# Patient Record
Sex: FEMALE | Race: WHITE | HISPANIC OR LATINO | Employment: UNEMPLOYED | URBAN - METROPOLITAN AREA
[De-identification: names, ages, dates, MRNs, and addresses within clinical notes are randomized per-mention and may not be internally consistent; named-entity substitution may affect disease eponyms.]

---

## 2024-02-28 ENCOUNTER — OFFICE VISIT (OUTPATIENT)
Dept: ORTHOPEDIC SURGERY | Facility: CLINIC | Age: 34
End: 2024-02-28
Payer: COMMERCIAL

## 2024-02-28 VITALS — HEIGHT: 65 IN | BODY MASS INDEX: 23.49 KG/M2 | WEIGHT: 141 LBS

## 2024-02-28 DIAGNOSIS — M25.531 RIGHT WRIST PAIN: Primary | ICD-10-CM

## 2024-02-28 DIAGNOSIS — M65.4 TENOSYNOVITIS, DE QUERVAIN: ICD-10-CM

## 2024-02-28 PROBLEM — M65.331 TRIGGER FINGER, RIGHT MIDDLE FINGER: Status: ACTIVE | Noted: 2024-02-28

## 2024-02-28 RX ADMIN — LIDOCAINE HYDROCHLORIDE 1 ML: 10 INJECTION, SOLUTION INFILTRATION; PERINEURAL at 15:36

## 2024-02-28 RX ADMIN — TRIAMCINOLONE ACETONIDE 40 MG: 40 INJECTION, SUSPENSION INTRA-ARTICULAR; INTRAMUSCULAR at 15:36

## 2024-02-28 NOTE — PROGRESS NOTES
"Chief Complaint  Initial Evaluation of the Right Wrist     Subjective      Loly Garcia presents to [facility] for initial evaluation of the right wrist. She is having pain in the wrist.  She has a baby and does a lot of lifting and carrying.  She has had some pain off and on occasionally in the past.  She used to get injections with DeQuervain in the past.  She wears a brace at times for support and stability.     No Known Allergies     Social History     Socioeconomic History    Marital status:    Tobacco Use    Smoking status: Never    Smokeless tobacco: Never   Vaping Use    Vaping Use: Never used        I reviewed the patient's chief complaint, history of present illness, review of systems, past medical history, surgical history, family history, social history, medications, and allergy list.     Review of Systems     Constitutional: Denies fevers, chills, weight loss  Cardiovascular: Denies chest pain, shortness of breath  Skin: Denies rashes, acute skin changes  Neurologic: Denies headache, loss of consciousness        Vital Signs:   Ht 165.1 cm (65\")   Wt 64 kg (141 lb)   BMI 23.46 kg/m²          Physical Exam  General: Alert. No acute distress    Ortho Exam        RIGHT WRIST  Full ROM of the hand, fingers, elbow and wrist.  Sensation grossly intact to light touch, median, radial and ulnar nerve. Positive AIN, PIN and ulnar nerve motor function intact. Axillary nerve intact. Positive pulses.        Right Carpal Tunnel  Date/Time: 2/28/2024 3:36 PM  Supporting Documentation  Indications: pain   Procedure Details  Location: -   Location: right carpal tunnel.Needle size: 23 G  Medications administered: 1 mL lidocaine 1 %; 40 mg triamcinolone acetonide 40 MG/ML  Patient tolerance: patient tolerated the procedure well with no immediate complications            Imaging Results (Most Recent)       None             Result Review :          Assessment and Plan     Diagnoses and all " orders for this visit:    1. Right wrist pain (Primary)  -     Right Carpal Tunnel    2. Tenosynovitis, de Quervain        Discussed the treatment plan with the patient.     Discussed injection She wants to hold off on that .      Discussed the risks and benefits of conservative measures.  The patient expressed understanding and wished to proceed with a right wrist steroid injection.  She tolerated the injection well.     Brace given.  HEP exercises.       Call or return if worsening symptoms.    Follow Up     PRN      Patient was given instructions and counseling regarding her condition or for health maintenance advice. Please see specific information pulled into the AVS if appropriate.     Scribed for Augie Worthy MD by Vashti Barr MA.  02/28/24   15:19 EST      I have personally performed the services described in this document as scribed by the above individual and it is both accurate and complete. Augie Worthy MD 02/29/24

## 2024-02-29 RX ORDER — TRIAMCINOLONE ACETONIDE 40 MG/ML
40 INJECTION, SUSPENSION INTRA-ARTICULAR; INTRAMUSCULAR
Status: COMPLETED | OUTPATIENT
Start: 2024-02-28 | End: 2024-02-28

## 2024-02-29 RX ORDER — LIDOCAINE HYDROCHLORIDE 10 MG/ML
1 INJECTION, SOLUTION INFILTRATION; PERINEURAL
Status: COMPLETED | OUTPATIENT
Start: 2024-02-28 | End: 2024-02-28

## 2024-04-02 ENCOUNTER — TELEPHONE (OUTPATIENT)
Dept: OBSTETRICS AND GYNECOLOGY | Facility: CLINIC | Age: 34
End: 2024-04-02
Payer: COMMERCIAL

## 2024-04-02 NOTE — TELEPHONE ENCOUNTER
Left a message for the patient no info in account that we tried to call her. She was advised it may of been a reminder call. Informed the patient to call back if further questions.

## 2024-04-02 NOTE — TELEPHONE ENCOUNTER
Caller: Loly Garcia    Relationship: Self    Best call back number: [phone]    What is the best time to reach you: ANYTIME    Who are you requesting to speak with (clinical staff, provider,  specific staff member): UNKNOWN    Do you know the name of the person who called: UNKNOWN    What was the call regarding: MAYBE ABOUT APPOINTMENT OR MEDICATION    Is it okay if the provider responds through MyChart: NO, CALL BACK PLEASE

## 2024-12-16 ENCOUNTER — OFFICE VISIT (OUTPATIENT)
Dept: ORTHOPEDIC SURGERY | Facility: CLINIC | Age: 34
End: 2024-12-16
Payer: COMMERCIAL

## 2024-12-16 VITALS — WEIGHT: 141 LBS | BODY MASS INDEX: 23.49 KG/M2 | HEIGHT: 65 IN

## 2024-12-16 DIAGNOSIS — M65.4 TENOSYNOVITIS, DE QUERVAIN: Primary | ICD-10-CM

## 2024-12-16 DIAGNOSIS — M25.531 RIGHT WRIST PAIN: ICD-10-CM

## 2024-12-16 RX ORDER — NORGESTIMATE AND ETHINYL ESTRADIOL 0.25-0.035
1 KIT ORAL DAILY
COMMUNITY

## 2024-12-16 RX ADMIN — TRIAMCINOLONE ACETONIDE 40 MG: 40 INJECTION, SUSPENSION INTRA-ARTICULAR; INTRAMUSCULAR at 11:20

## 2024-12-16 RX ADMIN — LIDOCAINE HYDROCHLORIDE 1 ML: 10 INJECTION, SOLUTION INFILTRATION; PERINEURAL at 11:20

## 2024-12-16 NOTE — PROGRESS NOTES
"Chief Complaint  Follow-up of the Right Wrist     Subjective      Loly Garcia presents to [facility] for follow up of the right wrist.  She is having pain in the wrist. She has a baby and does a lot of lifting and carrying. She has had some pain off and on occasionally in the past. She used to get injections with DeQuervain in the past. She wears a brace at times for support and stability. Her last injection was 2/28/24.      No Known Allergies     Social History     Socioeconomic History    Marital status:    Tobacco Use    Smoking status: Never    Smokeless tobacco: Never   Vaping Use    Vaping status: Never Used   Substance and Sexual Activity    Alcohol use: Yes     Alcohol/week: 2.0 standard drinks of alcohol     Types: 1 Glasses of wine, 1 Shots of liquor per week    Drug use: Never    Sexual activity: Yes     Partners: Male     Birth control/protection: Birth control pill        I reviewed the patient's chief complaint, history of present illness, review of systems, past medical history, surgical history, family history, social history, medications, and allergy list.     Review of Systems     Constitutional: Denies fevers, chills, weight loss  Cardiovascular: Denies chest pain, shortness of breath  Skin: Denies rashes, acute skin changes  Neurologic: Denies headache, loss of consciousness        Vital Signs:   Ht 165.1 cm (65\")   Wt 64 kg (141 lb)   BMI 23.46 kg/m²          Physical Exam  General: Alert. No acute distress    Ortho Exam        RIGHT WRIST Negative Compression testing/ Negative Tinels. PositiveFinkelsteins. Negative Marino's testing. Negative [facility]MC grind testing. Negative Phalens. Full ROM of the hand, fingers, elbow and wrist. Negative Triggering of the digit. Sensation grossly intact to light touch, median, radial and ulnar nerve. Positive AIN, PIN and ulnar nerve motor function intact. Axillary nerve intact. Positive pulses.  Mild swelling.       Right " Wrist Dequervains  Date/Time: 12/16/2024 11:20 AM  Consent given by: patient  Site marked: site marked  Timeout: Immediately prior to procedure a time out was called to verify the correct patient, procedure, equipment, support staff and site/side marked as required   Procedure Details  Location: wrist -   Preparation: Patient was prepped and draped in the usual sterile fashion  Needle size: 23 G  Medications administered: 1 mL lidocaine 1 %; 40 mg triamcinolone acetonide 40 MG/ML  Patient tolerance: patient tolerated the procedure well with no immediate complications    This injection documentation was Scribed for Augie Worthy MD by Edna Cherry.  12/16/24   11:20 EST        Imaging Results (Most Recent)       None             Result Review :             Assessment and Plan     Diagnoses and all orders for this visit:    1. Tenosynovitis, de Quervain (Primary)    2. Right wrist pain        Discussed the treatment plan with the patient.     Discussed the risks and benefits of conservative measures. The patient expressed understanding and wished to proceed with a right wrist steroid injection DeQuervain's.  She tolerated the injection well.     Discussed the treatment options with the patient, operative vs non-operative.       Call or return if worsening symptoms.    Follow Up     PRN      Patient was given instructions and counseling regarding her condition or for health maintenance advice. Please see specific information pulled into the AVS if appropriate.     Scribed for Augie Worthy MD by Vashti Barr MA.  12/16/24   10:12 EST    I have personally performed the services described in this document as scribed by the above individual and it is both accurate and complete. Augie Worthy MD 12/18/24

## 2024-12-18 RX ORDER — LIDOCAINE HYDROCHLORIDE 10 MG/ML
1 INJECTION, SOLUTION INFILTRATION; PERINEURAL
Status: COMPLETED | OUTPATIENT
Start: 2024-12-16 | End: 2024-12-16

## 2024-12-18 RX ORDER — TRIAMCINOLONE ACETONIDE 40 MG/ML
40 INJECTION, SUSPENSION INTRA-ARTICULAR; INTRAMUSCULAR
Status: COMPLETED | OUTPATIENT
Start: 2024-12-16 | End: 2024-12-16

## 2025-03-26 ENCOUNTER — OFFICE VISIT (OUTPATIENT)
Dept: ORTHOPEDIC SURGERY | Facility: CLINIC | Age: 35
End: 2025-03-26
Payer: COMMERCIAL

## 2025-03-26 VITALS
OXYGEN SATURATION: 98 % | SYSTOLIC BLOOD PRESSURE: 134 MMHG | DIASTOLIC BLOOD PRESSURE: 83 MMHG | HEART RATE: 54 BPM | WEIGHT: 148 LBS | BODY MASS INDEX: 24.66 KG/M2 | HEIGHT: 65 IN

## 2025-03-26 DIAGNOSIS — M25.531 RIGHT WRIST PAIN: Primary | ICD-10-CM

## 2025-03-26 DIAGNOSIS — M77.11 LATERAL EPICONDYLITIS OF RIGHT ELBOW: ICD-10-CM

## 2025-03-26 DIAGNOSIS — M65.931 SYNOVITIS OF RIGHT WRIST: ICD-10-CM

## 2025-03-26 RX ORDER — LIDOCAINE HYDROCHLORIDE 10 MG/ML
1 INJECTION, SOLUTION INFILTRATION; PERINEURAL
Status: COMPLETED | OUTPATIENT
Start: 2025-03-26 | End: 2025-03-26

## 2025-03-26 RX ORDER — TRIAMCINOLONE ACETONIDE 40 MG/ML
40 INJECTION, SUSPENSION INTRA-ARTICULAR; INTRAMUSCULAR
Status: COMPLETED | OUTPATIENT
Start: 2025-03-26 | End: 2025-03-26

## 2025-03-26 RX ADMIN — LIDOCAINE HYDROCHLORIDE 1 ML: 10 INJECTION, SOLUTION INFILTRATION; PERINEURAL at 11:59

## 2025-03-26 RX ADMIN — TRIAMCINOLONE ACETONIDE 40 MG: 40 INJECTION, SUSPENSION INTRA-ARTICULAR; INTRAMUSCULAR at 11:59

## 2025-03-26 NOTE — PROGRESS NOTES
"Chief Complaint  Follow-up of the Right Wrist     Subjective      Loly Garcia presents to [facility] for follow up of the right wrist.  She has pain on the top of the wrist.  She has been bracing at times.  She has a history of DeQuerveins.  She has tried anti inflammatories that is not giving much relief.  She is trying to modify activities of the right wrist.  She has a little one at home so does a lot of repetitive  activities     No Known Allergies     Social History     Socioeconomic History    Marital status:    Tobacco Use    Smoking status: Never    Smokeless tobacco: Never   Vaping Use    Vaping status: Never Used   Substance and Sexual Activity    Alcohol use: Yes     Alcohol/week: 2.0 standard drinks of alcohol     Types: 1 Glasses of wine, 1 Shots of liquor per week    Drug use: Never    Sexual activity: Yes     Partners: Male     Birth control/protection: Birth control pill        I reviewed the patient's chief complaint, history of present illness, review of systems, past medical history, surgical history, family history, social history, medications, and allergy list.     Review of Systems     Constitutional: Denies fevers, chills, weight loss  Cardiovascular: Denies chest pain, shortness of breath  Skin: Denies rashes, acute skin changes  Neurologic: Denies headache, loss of consciousness        Vital Signs:   /83   Pulse 54   Ht 165.1 cm (65\")   Wt 67.1 kg (148 lb)   SpO2 98%   BMI 24.63 kg/m²          Physical Exam  General: Alert. No acute distress    Ortho Exam        RIGHT WRIST Tender over the wrist.  Full ROM of the hand, fingers, elbow and wrist. . Sensation grossly intact to light touch, median, radial and ulnar nerve. Positive AIN, PIN and ulnar nerve motor function intact. Axillary nerve intact. Positive pulses.  Tender over the lateral epicondylitis.        Right Wrist  Date/Time: 3/26/2025 11:59 AM  Consent given by: patient  Site marked: " site marked  Timeout: Immediately prior to procedure a time out was called to verify the correct patient, procedure, equipment, support staff and site/side marked as required   Supporting Documentation  Indications: pain   Procedure Details  Location: wrist -   Preparation: Patient was prepped and draped in the usual sterile fashion  Needle size: 23 G  Medications administered: 1 mL lidocaine 1 %; 40 mg triamcinolone acetonide 40 MG/ML  Patient tolerance: patient tolerated the procedure well with no immediate complications    This injection documentation was Scribed for Augie Worthy MD by Edna Cherry.  03/26/25   12:00 EDT        Imaging Results (Most Recent)       None             Result Review :          Assessment and Plan     Diagnoses and all orders for this visit:    1. Right wrist pain (Primary)        Discussed the treatment plan with the patient.     Discussed physical therapy.  Discussed injection of the right wrist from overuse.      Modify activity. Brace as needed.  Prescribed physical therapy for the right wrist and elbow.     Discussed the risks and benefits of conservative measures. The patient expressed understanding and wished to proceed with a right wrist steroid injection.  She tolerated the injection well.      Discussed with the patient that due to the steroid injection given today in the office they may see an increase in blood sugar for a few days. Advised patient to monitor sugar after receiving the injection.     Discussed possibility of a reaction from the injection.  Discussed the possibility that the injection may not completely improve or remove the pain.  Discussed the risk of infection.      Call or return if worsening symptoms.    Follow Up     PRN Discussed MRI of the right wrist if the pain persists.        Patient was given instructions and counseling regarding her condition or for health maintenance advice. Please see specific information pulled into the AVS if  appropriate.     Scribed for Augie Worthy MD by Vashti Barr MA.  03/26/25   11:35 EDT      I have personally performed the services described in this document as scribed by the above individual and it is both accurate and complete. Augie Worthy MD 03/26/25

## 2025-03-27 DIAGNOSIS — M25.531 RIGHT WRIST PAIN: Primary | ICD-10-CM

## 2025-03-27 DIAGNOSIS — M65.931 SYNOVITIS OF RIGHT WRIST: ICD-10-CM

## 2025-04-09 ENCOUNTER — TREATMENT (OUTPATIENT)
Dept: PHYSICAL THERAPY | Facility: CLINIC | Age: 35
End: 2025-04-09
Payer: COMMERCIAL

## 2025-04-09 DIAGNOSIS — M25.531 RIGHT WRIST PAIN: Primary | ICD-10-CM

## 2025-04-09 DIAGNOSIS — M65.931 SYNOVITIS OF RIGHT WRIST: ICD-10-CM

## 2025-04-09 PROCEDURE — 97110 THERAPEUTIC EXERCISES: CPT | Performed by: PHYSICAL THERAPIST

## 2025-04-09 PROCEDURE — 97165 OT EVAL LOW COMPLEX 30 MIN: CPT | Performed by: PHYSICAL THERAPIST

## 2025-04-09 NOTE — PROGRESS NOTES
"Outpatient Occupational Therapy Ortho Initial Evaluation                                 [address] [place] [address]    Patient: Loly Garcia   : 1990  Diagnosis/ICD-10 Code:  Right wrist pain [M25.531]  Referring practitioner: Augie Worthy MD  Date of Initial Visit: 2025  Today's Date: 2025  Patient seen for 1 sessions               Subjective Questionnaire: QuickDASH:     Past Medical History: unremarkable    Subjective Evaluation    History of Present Illness  Mechanism of injury: Pt reports pain in the dorsal of her wrist over the past 3 months. She has history of R DeQuervains with 3-4 injections her last one in December. Pt received steroid injection on 3/26/25. Pt was given wrist support splint and referred for Occupational Therapy for evaluation and treatment. Pt reports the shot helped with continuous pain, but still not able to perform push up or weight bearing over last 4 years. Pt reports some discomfort in her R elbow also. She has good success with the brace as well.       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     Right Wrist/Hand   Tenderness in the first dorsal compartment and second dorsal compartment. No tenderness in the lateral epicondyle.     Additional Tenderness Details  Tenderness over R radial tunnel    Neurological Testing     Sensation     Wrist/Hand     Right   Intact: light touch    Active Range of Motion     Left Wrist   Wrist " flexion: 80 degrees   Wrist extension: 55 degrees   Radial deviation: 25 degrees   Ulnar deviation: 40 degrees     Right Wrist   Wrist flexion: 80 degrees   Wrist extension: 45 degrees   Radial deviation: 10 degrees   Ulnar deviation: 45 degrees     Additional Active Range of Motion Details  Full composite fist and thumb opposition with tightness in full thumb flexion    Strength/Myotome Testing     Left Wrist/Hand      (2nd hand position)     Trial 1: 73 lbs    Trial 2: 69 lbs    Trial 3: 74 lbs    Average: 72 lbs    Right Wrist/Hand   Wrist extension: 4+  Wrist flexion: 4+     (2nd hand position)     Trial 1: 67 lbs    Trial 2: 67 lbs    Trial 3: 69 lbs    Average: 67.67 lbs    Tests     Right Elbow   Negative Cozen's and Tinel's sign (cubital tunnel).     Right Wrist/Hand   Positive Tinel's sign (radial tunnel).   Negative Finkelstein's and Tinel's sign (medial nerve).           Assessment & Plan       Assessment  Impairments: abnormal coordination, abnormal or restricted ROM, activity intolerance, impaired physical strength and pain with function   Other impairment: pain when pinching and pull like when donning diapers on her son, lifting her son is painful, unable to weigth bear  Functional limitations: carrying objects, lifting, pulling and pushing   Prognosis: good    Goals  Plan Goals: 1. The patient complains of pain in the R wrist.                  LTG 1: 12 weeks:  The patient will report a pain rating of 1/10 or better in order to improve sleep quality and tolerance to performance of activities of daily living.                                  STATUS:  New                  STG 1a: 6 weeks:  The patient will report a pain rating of 4/10 or better at worst.                                   STATUS:  New  2. The patient has limited ROM of the R wrist                  LTG 2: 12 weeks:  The patient will demonstrate 55 degrees of wrist extension to allow the patient to weight bear on R wrist..                                   STATUS:  New                   STG 2a: 6 weeks:  The patient will demonstrate 50 degrees of wrist extension.                                  STATUS:  New                             3. The patient has limited strength of the R wrist..                  LTG 3: 12 weeks:  The patient will demonstrate MMT 5/5 in order to lift her child and valente his diapers.                                  STATUS:  New                  STG 3a: 6 weeks:  The patient will demonstrate independence with HEP for strengthening.                                  STATUS:  New  4. Carrying, Moving, and Handling Objects Functional Limitation                                    LTG 4: 12 weeks:  The patient will achieve a score of 11/55 on the Quick DASH.                                  STATUS:  New                  STG 4a: 6 weeks:  The patient will achieve a score of 16/55 on the Quick DASH.                                    STATUS:  New                        Plan  Planned modality interventions: TENS, thermotherapy (hydrocollator packs) and thermotherapy (paraffin bath)  Planned therapy interventions: manual therapy, functional ROM exercises, fine motor coordination training, flexibility, stretching, strengthening, home exercise program, neuromuscular re-education, soft tissue mobilization and therapeutic activities  Frequency: 2x week  Duration in weeks: 12  Treatment plan discussed with: patient          ICD-10-CM ICD-9-CM   1. Right wrist pain  M25.531 719.43   2. Synovitis of right wrist  M65.931 727.05       Patient is indicated for skilled occupational therapy services.    History # of Personal Factors and/or Comorbidities: LOW (0)  Examination of Body System(s): # of elements: MODERATE (3)  Clinical Presentation: STABLE   Clinical Decision Making: LOW      Evaluation:  Low Complexity:    30     mins  43801;  Mod Complexity:    0     mins  75908;  High Complexity:    0     mins  08553;    Timed:  Manual Therapy:     0     mins  17752;  Therapeutic Exercise:    15     mins  76772;   Therapeutic Activity:    0     mins  30570;  Neuromuscular Naresh:    0    mins  63541;    Ultrasound:     0     mins  09628;    Electrical Stimulation:    0     mins  14011 ( );      Timed Treatment:   15   mins   Total Treatment:     45   mins      OT SIGNATURE: Maribeth Tucker OTR/L    Electronically signed   License number [id number]   DATE TREATMENT INITIATED: 4/9/2025    Initial Certification  Certification Period: 4/9/2025 thru 7/7/2025  I certify that the therapy services are furnished while this patient is under my care.  The services outlined above are required by this patient, and will be reviewed every 90 days.     PHYSICIAN: Augie Worthy MD  NPI: [id number]                                          DATE:     Please sign and return via fax to  [phone]   Thank you, [facility].

## 2025-04-14 ENCOUNTER — TREATMENT (OUTPATIENT)
Dept: PHYSICAL THERAPY | Facility: CLINIC | Age: 35
End: 2025-04-14
Payer: COMMERCIAL

## 2025-04-14 DIAGNOSIS — M25.531 RIGHT WRIST PAIN: Primary | ICD-10-CM

## 2025-04-14 DIAGNOSIS — M65.931 SYNOVITIS OF RIGHT WRIST: ICD-10-CM

## 2025-04-14 PROCEDURE — 97110 THERAPEUTIC EXERCISES: CPT | Performed by: PHYSICAL THERAPIST

## 2025-04-14 PROCEDURE — 97530 THERAPEUTIC ACTIVITIES: CPT | Performed by: PHYSICAL THERAPIST

## 2025-04-14 PROCEDURE — 97112 NEUROMUSCULAR REEDUCATION: CPT | Performed by: PHYSICAL THERAPIST

## 2025-04-14 NOTE — PROGRESS NOTES
Occupational Therapy Daily Treatment Note  [address] [place] [id number]      Patient: Loly Garcia   : 1990  Referring practitioner: Augie Worthy MD  Date of Initial Visit: Type: THERAPY  Noted: 2025  Today's Date: 2025  Patient seen for 2 sessions         Subjective   Loly Garcia reports: her wrist is sore.    Objective   See Exercise, Manual, and Modality Logs for complete treatment.     Cupping performed along dorsal wrist and forearm with good response and minimal fibrotic tissue. Dynamic stabilization added today with good tolerance.  Assessment/Plan    Visit Diagnoses:    ICD-10-CM ICD-9-CM   1. Right wrist pain  M25.531 719.43   2. Synovitis of right wrist  M65.931 727.05       Continue per POC         Timed:  Manual Therapy:    8     mins  61273;  Therapeutic Exercise:    10     mins  65133;     Therapeutic Activity:    0    mins  15887;  Ultrasound:     0     mins  30944;    Electrical Stimulation:    0     mins 90384;  Neuromuscular Naresh:    10   mins  64582;      Timed Treatment:   28   mins   Total Treatment:     28   min    DEANNA Reyes/L  Occupational Therapist    Electronically signed   License number [id number]

## 2025-04-21 NOTE — PROGRESS NOTES
"Chief Complaint  Establish Care and Annual Exam    Subjective          Loly Garcia presents to [facility]  History of Present Illness    Previous PCP: Patient can not remember the providers name, but they was in [place]  Specialist(s): CHARITY Mahajan (GYN - [facility]), Maribeth Tucker (Occupational Therapy), Dr. Worthy (Orthopedics)  COVID vaccine: Last dose June 2021  Pap Smear: 04/12/2024    History of Present Illness      5-year-old female patient presents to the clinic today to establish care.  No significant medical history.  Takes multivitamin and oral birth control daily.  No breast cancer, colon cancer in her primary family members.  Pap smears up-to-date.  No concerns at this time.  Denies chest pain, shortness of breath, abdominal pain, nausea or vomiting diarrhea    Current Outpatient Medications   Medication Instructions    carbamide peroxide (Debrox) 6.5 % otic solution 5 drops, Both Ears, 2 Times Daily    multivitamin (MULTIVITAMIN PO) 1 tablet, Daily    norgestimate-ethinyl estradiol (ORTHO-CYCLEN) 0.25-35 MG-MCG per tablet 1 tablet, Daily       The following portions of the patient's history were reviewed and updated as appropriate: allergies, current medications, past family history, past medical history, past social history, past surgical history, and problem list.    Objective   Vital Signs:   /78 (BP Location: Right arm, Patient Position: Sitting, Cuff Size: Adult)   Pulse 67   Temp 97.3 °F (36.3 °C) (Temporal)   Ht 165.1 cm (65\")   Wt 67.1 kg (148 lb)   SpO2 98%   BMI 24.63 kg/m²     BP Readings from Last 3 Encounters:   04/25/25 113/78   03/26/25 134/83     Wt Readings from Last 3 Encounters:   04/25/25 67.1 kg (148 lb)   03/26/25 67.1 kg (148 lb)   12/16/24 64 kg (141 lb)     BMI is within normal parameters. No other follow-up for BMI required.     Physical Exam     Appearance: No acute distress, well-nourished  Head: " "normocephalic, atraumatic  Eyes: extraocular movements intact, no scleral icterus, no conjunctival injection  Ears, Nose, and Throat: external ears normal, nares patent, moist mucous membranes  Cardiovascular: regular rate and rhythm. no murmurs, rubs, or gallops. no edema  Respiratory: breathing comfortably, symmetric chest rise, clear to auscultation bilaterally. No wheezes, rales, or rhonchi.  Neuro: alert and oriented to time, place, and person. Normal gait  Psych: normal mood and affect     Physical Exam        Ear Cerumen Removal    Date/Time: 4/25/2025 11:25 AM    Performed by: Yari Lovell  Authorized by: Amber Ng APRN    Anesthesia:  Local Anesthetic: none  Location details: left ear and right ear  Patient tolerance: patient tolerated the procedure well with no immediate complications  Procedure type: instrumentation, irrigation   Sedation:  Patient sedated: no             Result Review :   The following data was reviewed by: CHARITY Young on 04/25/2025:      Results           No results found for: \"SARSANTIGEN\", \"COVID19\", \"RAPFLUA\", \"RAPFLUB\", \"FLUAAG\", \"FLUABDAG\", \"FLU\", \"FLUBAG\", \"RAPSCRN\", \"STREPAAG\", \"RSV\", \"POCPREGUR\", \"MONOSPOT\", \"INR\", \"LEADCAPBLD\", \"POCLEAD\", \"BILIRUBINUR\"         Assessment and Plan    Diagnoses and all orders for this visit:    1. Establishing care with new doctor, encounter for (Primary)    2. Annual physical exam  -     TSH Rfx On Abnormal To Free T4  -     CBC & Differential  -     Comprehensive Metabolic Panel  -     Lipid Panel    3. Screening for thyroid disorder  -     TSH Rfx On Abnormal To Free T4    4. Screening for lipid disorders  -     Lipid Panel    5. Bilateral impacted cerumen  -     Ear Cerumen Removal  -     carbamide peroxide (Debrox) 6.5 % otic solution; Administer 5 drops into both ears 2 (Two) Times a Day.  Dispense: 22 mL; Refill: 0      Advised on diet, physical activity, sunscreen, helmet, texting and driving, " etc    Assessment & Plan      There are no discontinued medications.       Follow Up   Return in about 1 year (around 4/25/2026) for Annual physical.  Patient was given instructions and counseling regarding her condition or for health maintenance advice. Please see specific information pulled into the AVS if appropriate.       CHARITY Young  04/25/25  16:01 EDT      Patient or patient representative verbalized consent for the use of Ambient Listening during the visit with  CHARITY Young for chart documentation. 4/25/2025  16:01 EDT

## 2025-04-23 ENCOUNTER — TREATMENT (OUTPATIENT)
Dept: PHYSICAL THERAPY | Facility: CLINIC | Age: 35
End: 2025-04-23
Payer: COMMERCIAL

## 2025-04-23 DIAGNOSIS — M65.931 SYNOVITIS OF RIGHT WRIST: ICD-10-CM

## 2025-04-23 DIAGNOSIS — M25.531 RIGHT WRIST PAIN: Primary | ICD-10-CM

## 2025-04-23 PROCEDURE — 97140 MANUAL THERAPY 1/> REGIONS: CPT | Performed by: PHYSICAL THERAPIST

## 2025-04-23 PROCEDURE — 97112 NEUROMUSCULAR REEDUCATION: CPT | Performed by: PHYSICAL THERAPIST

## 2025-04-23 PROCEDURE — 97110 THERAPEUTIC EXERCISES: CPT | Performed by: PHYSICAL THERAPIST

## 2025-04-23 PROCEDURE — 97530 THERAPEUTIC ACTIVITIES: CPT | Performed by: PHYSICAL THERAPIST

## 2025-04-23 NOTE — PROGRESS NOTES
Occupational Therapy Daily Treatment Note  [address] [place] [address]      Patient: Loly Garcia   : 1990  Referring practitioner: Augie Worthy MD  Date of Initial Visit: Type: THERAPY  Noted: 2025  Today's Date: 2025  Patient seen for 3 sessions         Subjective   Loly Garcia reports: in general I have not really had any pain, but I still can't do a push up.     Objective          Observations     Additional Wrist/Hand Observation Details  Increased visible snuff box on R wrist     Tenderness     Right Elbow   No tenderness in the lateral epicondyle.     Right Wrist/Hand   Tenderness in the first dorsal compartment and second dorsal compartment. No tenderness in the lateral epicondyle.     Additional Tenderness Details  Tenderness over R radial tunnel    Neurological Testing     Sensation     Wrist/Hand     Right   Intact: light touch    Active Range of Motion     Left Wrist   Wrist flexion: 80 degrees   Wrist extension: 55 degrees   Radial deviation: 25 degrees   Ulnar deviation: 40 degrees     Right Wrist   Wrist flexion: 80 degrees   Wrist extension: 50 degrees   Radial deviation: 10 degrees   Ulnar deviation: 45 degrees     Additional Active Range of Motion Details  Full composite fist and thumb opposition with tightness in full thumb flexion    Strength/Myotome Testing     Left Wrist/Hand      (2nd hand position)     Trial 1: 73 lbs    Trial 2: 69 lbs    Trial 3: 74 lbs    Average: 72 lbs    Right Wrist/Hand   Wrist extension: 4+  Wrist flexion: 4+     (2nd hand position)     Trial 1: 62 lbs    Trial 2: 64 lbs    Trial 3: 61 lbs    Average: 62.33 lbs    Tests     Right Elbow   Negative Cozen's and Tinel's sign (cubital tunnel).     Right Wrist/Hand   Positive Tinel's sign (radial tunnel).   Negative Finkelstein's and Tinel's sign (medial nerve).       See Exercise, Manual, and Modality Logs for complete treatment.     Moderate fibrotic tissue noted along dorsal radial  forearm with cupping and patient tolerated well.   Assessment/Plan    Visit Diagnoses:    ICD-10-CM ICD-9-CM   1. Right wrist pain  M25.531 719.43   2. Synovitis of right wrist  M65.931 727.05       Continue per POC         Timed:  Manual Therapy:    8     mins  97608;  Therapeutic Exercise:    8     mins  39647;     Therapeutic Activity:    8     mins  99068;  Ultrasound:     0     mins  32311;    Electrical Stimulation:    0     mins 91460;  Neuromuscular Naresh:    8    mins  88880;      Timed Treatment:   32   mins   Total Treatment:     32   min    DEANNA Reyes/L  Occupational Therapist    Electronically signed   License number [id number]

## 2025-04-25 ENCOUNTER — OFFICE VISIT (OUTPATIENT)
Dept: INTERNAL MEDICINE | Facility: CLINIC | Age: 35
End: 2025-04-25
Payer: COMMERCIAL

## 2025-04-25 ENCOUNTER — TREATMENT (OUTPATIENT)
Dept: PHYSICAL THERAPY | Facility: CLINIC | Age: 35
End: 2025-04-25
Payer: COMMERCIAL

## 2025-04-25 VITALS
BODY MASS INDEX: 24.66 KG/M2 | HEIGHT: 65 IN | SYSTOLIC BLOOD PRESSURE: 113 MMHG | WEIGHT: 148 LBS | HEART RATE: 67 BPM | DIASTOLIC BLOOD PRESSURE: 78 MMHG | TEMPERATURE: 97.3 F | OXYGEN SATURATION: 98 %

## 2025-04-25 DIAGNOSIS — Z13.29 SCREENING FOR THYROID DISORDER: ICD-10-CM

## 2025-04-25 DIAGNOSIS — Z76.89 ESTABLISHING CARE WITH NEW DOCTOR, ENCOUNTER FOR: Primary | ICD-10-CM

## 2025-04-25 DIAGNOSIS — M65.931 SYNOVITIS OF RIGHT WRIST: ICD-10-CM

## 2025-04-25 DIAGNOSIS — Z00.00 ANNUAL PHYSICAL EXAM: ICD-10-CM

## 2025-04-25 DIAGNOSIS — M25.531 RIGHT WRIST PAIN: Primary | ICD-10-CM

## 2025-04-25 DIAGNOSIS — Z13.220 SCREENING FOR LIPID DISORDERS: ICD-10-CM

## 2025-04-25 DIAGNOSIS — H61.23 BILATERAL IMPACTED CERUMEN: ICD-10-CM

## 2025-04-25 LAB
ALBUMIN SERPL-MCNC: 4.2 G/DL (ref 3.5–5.2)
ALBUMIN/GLOB SERPL: 1.3 G/DL
ALP SERPL-CCNC: 75 U/L (ref 39–117)
ALT SERPL W P-5'-P-CCNC: 16 U/L (ref 1–33)
ANION GAP SERPL CALCULATED.3IONS-SCNC: 7.7 MMOL/L (ref 5–15)
AST SERPL-CCNC: 19 U/L (ref 1–32)
BASOPHILS # BLD AUTO: 0.04 10*3/MM3 (ref 0–0.2)
BASOPHILS NFR BLD AUTO: 0.8 % (ref 0–1.5)
BILIRUB SERPL-MCNC: 0.4 MG/DL (ref 0–1.2)
BUN SERPL-MCNC: 16 MG/DL (ref 6–20)
BUN/CREAT SERPL: 20.8 (ref 7–25)
CALCIUM SPEC-SCNC: 9.3 MG/DL (ref 8.6–10.5)
CHLORIDE SERPL-SCNC: 103 MMOL/L (ref 98–107)
CHOLEST SERPL-MCNC: 203 MG/DL (ref 0–200)
CO2 SERPL-SCNC: 26.3 MMOL/L (ref 22–29)
CREAT SERPL-MCNC: 0.77 MG/DL (ref 0.57–1)
DEPRECATED RDW RBC AUTO: 40.4 FL (ref 37–54)
EGFRCR SERPLBLD CKD-EPI 2021: 102.7 ML/MIN/1.73
EOSINOPHIL # BLD AUTO: 0.06 10*3/MM3 (ref 0–0.4)
EOSINOPHIL NFR BLD AUTO: 1.2 % (ref 0.3–6.2)
ERYTHROCYTE [DISTWIDTH] IN BLOOD BY AUTOMATED COUNT: 12.4 % (ref 12.3–15.4)
GLOBULIN UR ELPH-MCNC: 3.3 GM/DL
GLUCOSE SERPL-MCNC: 64 MG/DL (ref 65–99)
HCT VFR BLD AUTO: 41.8 % (ref 34–46.6)
HDLC SERPL-MCNC: 71 MG/DL (ref 40–60)
HGB BLD-MCNC: 13.9 G/DL (ref 12–15.9)
IMM GRANULOCYTES # BLD AUTO: 0.01 10*3/MM3 (ref 0–0.05)
IMM GRANULOCYTES NFR BLD AUTO: 0.2 % (ref 0–0.5)
LDLC SERPL CALC-MCNC: 114 MG/DL (ref 0–100)
LDLC/HDLC SERPL: 1.56 {RATIO}
LYMPHOCYTES # BLD AUTO: 1.27 10*3/MM3 (ref 0.7–3.1)
LYMPHOCYTES NFR BLD AUTO: 25.2 % (ref 19.6–45.3)
MCH RBC QN AUTO: 29.2 PG (ref 26.6–33)
MCHC RBC AUTO-ENTMCNC: 33.3 G/DL (ref 31.5–35.7)
MCV RBC AUTO: 87.8 FL (ref 79–97)
MONOCYTES # BLD AUTO: 0.26 10*3/MM3 (ref 0.1–0.9)
MONOCYTES NFR BLD AUTO: 5.2 % (ref 5–12)
NEUTROPHILS NFR BLD AUTO: 3.39 10*3/MM3 (ref 1.7–7)
NEUTROPHILS NFR BLD AUTO: 67.4 % (ref 42.7–76)
NRBC BLD AUTO-RTO: 0 /100 WBC (ref 0–0.2)
PLATELET # BLD AUTO: 240 10*3/MM3 (ref 140–450)
PMV BLD AUTO: 10.5 FL (ref 6–12)
POTASSIUM SERPL-SCNC: 4.6 MMOL/L (ref 3.5–5.2)
PROT SERPL-MCNC: 7.5 G/DL (ref 6–8.5)
RBC # BLD AUTO: 4.76 10*6/MM3 (ref 3.77–5.28)
SODIUM SERPL-SCNC: 137 MMOL/L (ref 136–145)
TRIGL SERPL-MCNC: 105 MG/DL (ref 0–150)
TSH SERPL DL<=0.05 MIU/L-ACNC: 1.28 UIU/ML (ref 0.27–4.2)
VLDLC SERPL-MCNC: 18 MG/DL (ref 5–40)
WBC NRBC COR # BLD AUTO: 5.03 10*3/MM3 (ref 3.4–10.8)

## 2025-04-25 PROCEDURE — 97112 NEUROMUSCULAR REEDUCATION: CPT | Performed by: PHYSICAL THERAPIST

## 2025-04-25 PROCEDURE — 97530 THERAPEUTIC ACTIVITIES: CPT | Performed by: PHYSICAL THERAPIST

## 2025-04-25 PROCEDURE — 80061 LIPID PANEL: CPT | Performed by: NURSE PRACTITIONER

## 2025-04-25 PROCEDURE — 80050 GENERAL HEALTH PANEL: CPT | Performed by: NURSE PRACTITIONER

## 2025-04-25 PROCEDURE — 97110 THERAPEUTIC EXERCISES: CPT | Performed by: PHYSICAL THERAPIST

## 2025-04-25 RX ORDER — DIPHENOXYLATE HYDROCHLORIDE AND ATROPINE SULFATE 2.5; .025 MG/1; MG/1
1 TABLET ORAL DAILY
COMMUNITY

## 2025-04-25 NOTE — PROGRESS NOTES
Occupational Therapy Daily Treatment Note  [address] [place] [address]      Patient: Loly Garcia   : 1990  Referring practitioner: Augie Worthy MD  Date of Initial Visit: Type: THERAPY  Noted: 2025  Today's Date: 2025  Patient seen for 4 sessions         Subjective   Loly Garcia reports: yesterday I was vacuuming and moved my arm sideways and I could feel it. It feels ok today.     Objective   See Exercise, Manual, and Modality Logs for complete treatment.   Pt did have sharp pain with wrist extension with flexbar and also felt some discomfort with gripping in pronation. Pt has more discomfort in her thumb today than the wrist.     Assessment/Plan    Visit Diagnoses:    ICD-10-CM ICD-9-CM   1. Right wrist pain  M25.531 719.43   2. Synovitis of right wrist  M65.931 727.05       Continue per POC          Timed:  Manual Therapy:    0     mins  92345;  Therapeutic Exercise:    10     mins  46123;     Therapeutic Activity:    10     mins  93289;  Ultrasound:     0     mins  96400;    Electrical Stimulation:    0     mins 85945;  Neuromuscular Naresh:    10    mins  05477;      Timed Treatment:   30   mins   Total Treatment:     30   min    DEANNA Reyes/L  Occupational Therapist    Electronically signed   License number [id number]   Please monitor blood pressure and pulse twice a day and write down readings. Bring numbers and machine in when you come in for blood pressure check with the nurse.     Recommend RSV vaccine.     Call to schedule eye doctor appt with Dr Brennan

## 2025-04-29 ENCOUNTER — TREATMENT (OUTPATIENT)
Dept: PHYSICAL THERAPY | Facility: CLINIC | Age: 35
End: 2025-04-29
Payer: COMMERCIAL

## 2025-04-29 DIAGNOSIS — M65.931 SYNOVITIS OF RIGHT WRIST: ICD-10-CM

## 2025-04-29 DIAGNOSIS — M25.531 RIGHT WRIST PAIN: Primary | ICD-10-CM

## 2025-04-29 PROCEDURE — 97110 THERAPEUTIC EXERCISES: CPT | Performed by: PHYSICAL THERAPIST

## 2025-04-29 PROCEDURE — 97112 NEUROMUSCULAR REEDUCATION: CPT | Performed by: PHYSICAL THERAPIST

## 2025-04-29 PROCEDURE — 97140 MANUAL THERAPY 1/> REGIONS: CPT | Performed by: PHYSICAL THERAPIST

## 2025-04-29 PROCEDURE — 97530 THERAPEUTIC ACTIVITIES: CPT | Performed by: PHYSICAL THERAPIST

## 2025-04-29 NOTE — PROGRESS NOTES
Occupational Therapy Daily Treatment Note  [address] [place] [address]      Patient: Loly Garcia   : 1990  Referring practitioner: Augie Worthy MD  Date of Initial Visit: Type: THERAPY  Noted: 2025  Today's Date: 2025  Patient seen for 5 sessions         Subjective Evaluation    Pain  Current pain ratin  Location: R wrist       Loly Garcia reports: the last couple days it has been hurting. I do get a little shock that goes into the hand with certain movements especially wrist extension.    Objective          Observations     Additional Wrist/Hand Observation Details  Increased visible snuff box on R wrist     Tenderness     Right Elbow   No tenderness in the lateral epicondyle.     Right Wrist/Hand   Tenderness in the second dorsal compartment. No tenderness in the first dorsal compartment and lateral epicondyle.     Additional Tenderness Details  Tenderness over R radial tunnel    Neurological Testing     Sensation     Wrist/Hand     Right   Intact: light touch    Active Range of Motion     Left Wrist   Wrist flexion: 80 degrees   Wrist extension: 55 degrees   Radial deviation: 25 degrees   Ulnar deviation: 40 degrees     Right Wrist   Wrist flexion: 80 degrees   Wrist extension: 50 degrees   Radial deviation: 10 degrees   Ulnar deviation: 45 degrees     Additional Active Range of Motion Details  Full composite fist and thumb opposition with tightness in full thumb flexion    Strength/Myotome Testing     Left Wrist/Hand      (2nd hand position)     Trial 1: 73 lbs    Trial 2: 69 lbs    Trial 3: 74 lbs    Average: 72 lbs    Right Wrist/Hand   Wrist extension: 4+  Wrist flexion: 4+     (2nd hand position)     Trial 1: 62 lbs    Trial 2: 64 lbs    Trial 3: 65 lbs    Average: 63.67 lbs    Tests     Right Elbow   Negative Cozen's and Tinel's sign (cubital tunnel).     Right Wrist/Hand   Positive Tinel's sign (radial tunnel).   Negative Finkelstein's and Tinel's sign (medial  nerve).       See Exercise, Manual, and Modality Logs for complete treatment.     Pt had discomfort with dynamic stabilization with Frisbee and with wrist maze. Kinesio tape applied over dorsal wrist with 50% pull splint in middle to provide stability and for pain relief.  Assessment/Plan    Visit Diagnoses:    ICD-10-CM ICD-9-CM   1. Right wrist pain  M25.531 719.43   2. Synovitis of right wrist  M65.931 727.05       Pt instructed to return to isometrics instead of resistive wrist 3 planes. Continue per POC         Timed:  Manual Therapy:    8     mins  88676;  Therapeutic Exercise:    8     mins  22929;     Therapeutic Activity:   8    mins  28979;  Ultrasound:     0     mins  05119;    Electrical Stimulation:    0     mins 12608;  Neuromuscular Naresh:    8    mins  74249;      Timed Treatment:   32   mins   Total Treatment:     32   min    Maribeth Tucker OTR/L  Occupational Therapist    Electronically signed   License number [id number]

## 2025-04-30 ENCOUNTER — TREATMENT (OUTPATIENT)
Dept: PHYSICAL THERAPY | Facility: CLINIC | Age: 35
End: 2025-04-30
Payer: COMMERCIAL

## 2025-04-30 DIAGNOSIS — M65.931 SYNOVITIS OF RIGHT WRIST: ICD-10-CM

## 2025-04-30 DIAGNOSIS — M25.531 RIGHT WRIST PAIN: Primary | ICD-10-CM

## 2025-04-30 PROCEDURE — 97530 THERAPEUTIC ACTIVITIES: CPT | Performed by: PHYSICAL THERAPIST

## 2025-04-30 PROCEDURE — 97110 THERAPEUTIC EXERCISES: CPT | Performed by: PHYSICAL THERAPIST

## 2025-04-30 PROCEDURE — 97112 NEUROMUSCULAR REEDUCATION: CPT | Performed by: PHYSICAL THERAPIST

## 2025-04-30 PROCEDURE — 97140 MANUAL THERAPY 1/> REGIONS: CPT | Performed by: PHYSICAL THERAPIST

## 2025-04-30 NOTE — PROGRESS NOTES
Occupational Therapy Daily Treatment Note  [address] [place] [address]      Patient: Loly Garcia   : 1990  Referring practitioner: Augie Worthy MD  Date of Initial Visit: Type: THERAPY  Noted: 2025  Today's Date: 2025  Patient seen for 6 sessions         Subjective Evaluation    Pain  Current pain ratin  Location: R wrist       Loly Garcia reports: I noticed that when I put my hands on the treadmill to push it did not hurt like it used to. If I move it back too fast I can feel it more. The pain is always on the top.    Objective   See Exercise, Manual, and Modality Logs for complete treatment.   Kinesio tape still in place over dorsal wrist.   Pt tolerated treatment well. She still had discomfort with dynamic stabilization with frisbe and marble. Soft tissue mobilization performed with cupping and moderate fibrotic tissue noted along dorsal forearm  Assessment/Plan    Visit Diagnoses:    ICD-10-CM ICD-9-CM   1. Right wrist pain  M25.531 719.43   2. Synovitis of right wrist  M65.931 727.05       Continue per POC         Timed:  Manual Therapy:    8     mins  77155;  Therapeutic Exercise:    8     mins  06582;     Therapeutic Activity:    8     mins  69308;  Ultrasound:     0     mins  94716;    Electrical Stimulation:    0     mins 83557;  Neuromuscular Naresh:    8    mins  26637;      Timed Treatment:   32   mins   Total Treatment:     32   min    DEANNA Reyes/L  Occupational Therapist    Electronically signed   License number [id number]

## 2025-05-02 ENCOUNTER — PATIENT ROUNDING (BHMG ONLY) (OUTPATIENT)
Dept: INTERNAL MEDICINE | Facility: CLINIC | Age: 35
End: 2025-05-02
Payer: COMMERCIAL

## 2025-05-02 NOTE — PROGRESS NOTES
May 2, 2025    Hello, may I speak with Loly Garcia?    My name is Chhaya      I am  with [facility]  [address]  [place] [address] [phone].    Before we get started may I verify your date of birth? 1990    I am calling to officially welcome you to our practice and ask about your recent visit. Is this a good time to talk? My chart message sent for patient rounding.

## 2025-05-05 ENCOUNTER — TREATMENT (OUTPATIENT)
Dept: PHYSICAL THERAPY | Facility: CLINIC | Age: 35
End: 2025-05-05
Payer: COMMERCIAL

## 2025-05-05 DIAGNOSIS — M65.931 SYNOVITIS OF RIGHT WRIST: ICD-10-CM

## 2025-05-05 DIAGNOSIS — M25.531 RIGHT WRIST PAIN: Primary | ICD-10-CM

## 2025-05-05 PROCEDURE — 97530 THERAPEUTIC ACTIVITIES: CPT | Performed by: PHYSICAL THERAPIST

## 2025-05-05 PROCEDURE — 97140 MANUAL THERAPY 1/> REGIONS: CPT | Performed by: PHYSICAL THERAPIST

## 2025-05-05 PROCEDURE — 97110 THERAPEUTIC EXERCISES: CPT | Performed by: PHYSICAL THERAPIST

## 2025-05-05 PROCEDURE — 97112 NEUROMUSCULAR REEDUCATION: CPT | Performed by: PHYSICAL THERAPIST

## 2025-05-05 NOTE — PROGRESS NOTES
Occupational Therapy Daily Treatment Note  [address] [place] [address]      Patient: Loly Garcia   : 1990  Referring practitioner: Augie Worthy MD  Date of Initial Visit: Type: THERAPY  Noted: 2025  Today's Date: 2025  Patient seen for 7 sessions         Subjective   Loly Garcia reports: I went over to the gym and did some planks and had no pain. It feels really good today. Still no push ups. Over the weekend I could still feel some with a circular motion.     Objective   See Exercise, Manual, and Modality Logs for complete treatment.   Pt tolerated treatment well with no pain. She did have pain with cupping along first dorsal compartment.     Assessment/Plan    Visit Diagnoses:    ICD-10-CM ICD-9-CM   1. Right wrist pain  M25.531 719.43   2. Synovitis of right wrist  M65.931 727.05       Continue per POC for one more visit then prepare for discharge to [facility].         Timed:  Manual Therapy:    8     mins  08567;  Therapeutic Exercise:    8     mins  83654;     Therapeutic Activity:    8     mins  67773;  Ultrasound:     0     mins  88477;    Electrical Stimulation:    0     mins 09948;  Neuromuscular Naresh:    8    mins  86610;      Timed Treatment:   32   mins   Total Treatment:     32   min    DEANNA Reyes/L  Occupational Therapist    Electronically signed   License number [id number]

## 2025-05-07 ENCOUNTER — TREATMENT (OUTPATIENT)
Dept: PHYSICAL THERAPY | Facility: CLINIC | Age: 35
End: 2025-05-07
Payer: COMMERCIAL

## 2025-05-07 DIAGNOSIS — M25.531 RIGHT WRIST PAIN: Primary | ICD-10-CM

## 2025-05-07 DIAGNOSIS — M65.931 SYNOVITIS OF RIGHT WRIST: ICD-10-CM

## 2025-05-07 PROCEDURE — 97140 MANUAL THERAPY 1/> REGIONS: CPT | Performed by: PHYSICAL THERAPIST

## 2025-05-07 PROCEDURE — 97112 NEUROMUSCULAR REEDUCATION: CPT | Performed by: PHYSICAL THERAPIST

## 2025-05-07 PROCEDURE — 97530 THERAPEUTIC ACTIVITIES: CPT | Performed by: PHYSICAL THERAPIST

## 2025-05-07 PROCEDURE — 97110 THERAPEUTIC EXERCISES: CPT | Performed by: PHYSICAL THERAPIST

## 2025-05-07 NOTE — PROGRESS NOTES
Occupational Therapy Daily Treatment Note  [address] [place] [address]      Patient: Loly Garcia   : 1990  Referring practitioner: Augie Worthy MD  Date of Initial Visit: Type: THERAPY  Noted: 2025  Today's Date: 2025  Patient seen for 8 sessions         Subjective   Loly Garcia reports: I did go to the gym and used a 3 lb weight in mid range with no issues with wrist flexion and RD, but some pain with extension and if I went all the way. I did some dips but only got to 4 before it started getting sore.    Objective          Observations     Additional Wrist/Hand Observation Details  Increased visible snuff box on R wrist     Tenderness     Right Elbow   No tenderness in the lateral epicondyle.     Right Wrist/Hand   Tenderness in the first dorsal compartment and second dorsal compartment. No tenderness in the lateral epicondyle.     Additional Tenderness Details  Tenderness over R radial tunnel    Neurological Testing     Sensation     Wrist/Hand     Right   Intact: light touch    Active Range of Motion     Left Wrist   Wrist flexion: 80 degrees   Wrist extension: 55 degrees   Radial deviation: 25 degrees   Ulnar deviation: 40 degrees     Right Wrist   Wrist flexion: 80 degrees   Wrist extension: 50 degrees   Radial deviation: 10 degrees   Ulnar deviation: 45 degrees     Additional Active Range of Motion Details  Full composite fist and thumb opposition with tightness in full thumb flexion    Strength/Myotome Testing     Left Wrist/Hand      (2nd hand position)     Trial 1: 73 lbs    Trial 2: 69 lbs    Trial 3: 74 lbs    Average: 72 lbs    Right Wrist/Hand   Wrist extension: 4+  Wrist flexion: 5     (2nd hand position)     Trial 1: 75 lbs    Trial 2: 74 lbs    Trial 3: 69 lbs    Average: 72.67 lbs    Tests     Right Elbow   Negative Cozen's and Tinel's sign (cubital tunnel).     Right Wrist/Hand   Positive Tinel's sign (radial tunnel).   Negative Finkelstein's and Tinel's sign  (medial nerve).       See Exercise, Manual, and Modality Logs for complete treatment.       Assessment/Plan    Visit Diagnoses:    ICD-10-CM ICD-9-CM   1. Right wrist pain  M25.531 719.43   2. Synovitis of right wrist  M65.931 727.05       Pt wishes to try HEP. Discharge from Occupational Thepray.         Timed:  Manual Therapy:    8     mins  34126;  Therapeutic Exercise:    8     mins  01022;     Therapeutic Activity:    8     mins  28866;  Ultrasound:     0     mins  50805;    Electrical Stimulation:    0     mins 29717;  Neuromuscular Naresh:    8    mins  18403;      Timed Treatment:   32   mins   Total Treatment:     32   min    DEANNA Reyes/L  Occupational Therapist    Electronically signed   License number [id number]